# Patient Record
Sex: MALE | Race: WHITE | NOT HISPANIC OR LATINO | Employment: UNEMPLOYED | URBAN - METROPOLITAN AREA
[De-identification: names, ages, dates, MRNs, and addresses within clinical notes are randomized per-mention and may not be internally consistent; named-entity substitution may affect disease eponyms.]

---

## 2024-01-18 ENCOUNTER — HOSPITAL ENCOUNTER (OUTPATIENT)
Facility: OTHER | Age: 39
Discharge: HOME | End: 2024-01-18
Attending: EMERGENCY MEDICINE | Admitting: EMERGENCY MEDICINE
Payer: MEDICAID

## 2024-01-18 VITALS
OXYGEN SATURATION: 96 % | RESPIRATION RATE: 18 BRPM | BODY MASS INDEX: 34.46 KG/M2 | DIASTOLIC BLOOD PRESSURE: 74 MMHG | WEIGHT: 260 LBS | TEMPERATURE: 98 F | HEIGHT: 73 IN | HEART RATE: 97 BPM | SYSTOLIC BLOOD PRESSURE: 136 MMHG

## 2024-01-18 DIAGNOSIS — L03.116 CELLULITIS OF LEFT FOOT: Primary | ICD-10-CM

## 2024-01-18 DIAGNOSIS — M79.89 LEG SWELLING: ICD-10-CM

## 2024-01-18 DIAGNOSIS — L03.119 CELLULITIS, LEG: ICD-10-CM

## 2024-01-18 LAB
ANION GAP SERPL CALC-SCNC: 11 MMOL/L (ref 8–16)
BASOPHILS # BLD AUTO: 0.06 K/UL (ref 0–0.2)
BASOPHILS NFR BLD: 0.6 % (ref 0–1.9)
BUN SERPL-MCNC: 22 MG/DL (ref 6–20)
CALCIUM SERPL-MCNC: 9.3 MG/DL (ref 8.7–10.5)
CHLORIDE SERPL-SCNC: 104 MMOL/L (ref 95–110)
CO2 SERPL-SCNC: 24 MMOL/L (ref 23–29)
CREAT SERPL-MCNC: 1 MG/DL (ref 0.5–1.4)
DIFFERENTIAL METHOD BLD: ABNORMAL
EOSINOPHIL # BLD AUTO: 0.2 K/UL (ref 0–0.5)
EOSINOPHIL NFR BLD: 2.4 % (ref 0–8)
ERYTHROCYTE [DISTWIDTH] IN BLOOD BY AUTOMATED COUNT: 12.8 % (ref 11.5–14.5)
EST. GFR  (NO RACE VARIABLE): >60 ML/MIN/1.73 M^2
GLUCOSE SERPL-MCNC: 98 MG/DL (ref 70–110)
HCT VFR BLD AUTO: 42.6 % (ref 40–54)
HCV AB SERPL QL IA: NEGATIVE
HGB BLD-MCNC: 14 G/DL (ref 14–18)
HIV 1+2 AB+HIV1 P24 AG SERPL QL IA: NEGATIVE
IMM GRANULOCYTES # BLD AUTO: 0.04 K/UL (ref 0–0.04)
IMM GRANULOCYTES NFR BLD AUTO: 0.4 % (ref 0–0.5)
LACTATE SERPL-SCNC: 0.8 MMOL/L (ref 0.5–2.2)
LYMPHOCYTES # BLD AUTO: 2.3 K/UL (ref 1–4.8)
LYMPHOCYTES NFR BLD: 24.3 % (ref 18–48)
MCH RBC QN AUTO: 30.5 PG (ref 27–31)
MCHC RBC AUTO-ENTMCNC: 32.9 G/DL (ref 32–36)
MCV RBC AUTO: 93 FL (ref 82–98)
MONOCYTES # BLD AUTO: 0.7 K/UL (ref 0.3–1)
MONOCYTES NFR BLD: 7.8 % (ref 4–15)
NEUTROPHILS # BLD AUTO: 6.2 K/UL (ref 1.8–7.7)
NEUTROPHILS NFR BLD: 64.5 % (ref 38–73)
NRBC BLD-RTO: 0 /100 WBC
PLATELET # BLD AUTO: 352 K/UL (ref 150–450)
PMV BLD AUTO: 9 FL (ref 9.2–12.9)
POTASSIUM SERPL-SCNC: 4.3 MMOL/L (ref 3.5–5.1)
RBC # BLD AUTO: 4.59 M/UL (ref 4.6–6.2)
SODIUM SERPL-SCNC: 139 MMOL/L (ref 136–145)
WBC # BLD AUTO: 9.54 K/UL (ref 3.9–12.7)

## 2024-01-18 PROCEDURE — 86803 HEPATITIS C AB TEST: CPT | Performed by: EMERGENCY MEDICINE

## 2024-01-18 PROCEDURE — 80048 BASIC METABOLIC PNL TOTAL CA: CPT | Performed by: EMERGENCY MEDICINE

## 2024-01-18 PROCEDURE — 87389 HIV-1 AG W/HIV-1&-2 AB AG IA: CPT | Performed by: EMERGENCY MEDICINE

## 2024-01-18 PROCEDURE — 85025 COMPLETE CBC W/AUTO DIFF WBC: CPT | Performed by: EMERGENCY MEDICINE

## 2024-01-18 PROCEDURE — G0378 HOSPITAL OBSERVATION PER HR: HCPCS

## 2024-01-18 PROCEDURE — 25000003 PHARM REV CODE 250: Performed by: EMERGENCY MEDICINE

## 2024-01-18 PROCEDURE — 83605 ASSAY OF LACTIC ACID: CPT | Performed by: EMERGENCY MEDICINE

## 2024-01-18 PROCEDURE — 63600175 PHARM REV CODE 636 W HCPCS: Performed by: EMERGENCY MEDICINE

## 2024-01-18 PROCEDURE — 63600175 PHARM REV CODE 636 W HCPCS: Performed by: PHYSICIAN ASSISTANT

## 2024-01-18 PROCEDURE — 96366 THER/PROPH/DIAG IV INF ADDON: CPT

## 2024-01-18 PROCEDURE — 96365 THER/PROPH/DIAG IV INF INIT: CPT | Mod: 59

## 2024-01-18 PROCEDURE — 25000003 PHARM REV CODE 250: Performed by: PHYSICIAN ASSISTANT

## 2024-01-18 PROCEDURE — 96375 TX/PRO/DX INJ NEW DRUG ADDON: CPT

## 2024-01-18 PROCEDURE — 99285 EMERGENCY DEPT VISIT HI MDM: CPT | Mod: 25

## 2024-01-18 PROCEDURE — A4216 STERILE WATER/SALINE, 10 ML: HCPCS | Performed by: PHYSICIAN ASSISTANT

## 2024-01-18 PROCEDURE — 25500020 PHARM REV CODE 255: Performed by: STUDENT IN AN ORGANIZED HEALTH CARE EDUCATION/TRAINING PROGRAM

## 2024-01-18 PROCEDURE — 87040 BLOOD CULTURE FOR BACTERIA: CPT | Mod: 59 | Performed by: EMERGENCY MEDICINE

## 2024-01-18 RX ORDER — KETOROLAC TROMETHAMINE 30 MG/ML
15 INJECTION, SOLUTION INTRAMUSCULAR; INTRAVENOUS EVERY 6 HOURS PRN
Status: DISCONTINUED | OUTPATIENT
Start: 2024-01-18 | End: 2024-01-18 | Stop reason: HOSPADM

## 2024-01-18 RX ORDER — NALOXONE HCL 0.4 MG/ML
0.02 VIAL (ML) INJECTION
Status: DISCONTINUED | OUTPATIENT
Start: 2024-01-18 | End: 2024-01-18 | Stop reason: HOSPADM

## 2024-01-18 RX ORDER — AMOXICILLIN 250 MG
1 CAPSULE ORAL 2 TIMES DAILY PRN
Status: DISCONTINUED | OUTPATIENT
Start: 2024-01-18 | End: 2024-01-18 | Stop reason: HOSPADM

## 2024-01-18 RX ORDER — OXYCODONE AND ACETAMINOPHEN 10; 325 MG/1; MG/1
1 TABLET ORAL EVERY 6 HOURS PRN
Status: DISCONTINUED | OUTPATIENT
Start: 2024-01-18 | End: 2024-01-18 | Stop reason: HOSPADM

## 2024-01-18 RX ORDER — KETOROLAC TROMETHAMINE 30 MG/ML
10 INJECTION, SOLUTION INTRAMUSCULAR; INTRAVENOUS
Status: COMPLETED | OUTPATIENT
Start: 2024-01-18 | End: 2024-01-18

## 2024-01-18 RX ORDER — TALC
6 POWDER (GRAM) TOPICAL NIGHTLY PRN
Status: DISCONTINUED | OUTPATIENT
Start: 2024-01-18 | End: 2024-01-18 | Stop reason: HOSPADM

## 2024-01-18 RX ORDER — SODIUM CHLORIDE 9 MG/ML
1000 INJECTION, SOLUTION INTRAVENOUS
Status: COMPLETED | OUTPATIENT
Start: 2024-01-18 | End: 2024-01-18

## 2024-01-18 RX ORDER — SODIUM CHLORIDE 0.9 % (FLUSH) 0.9 %
10 SYRINGE (ML) INJECTION EVERY 8 HOURS
Status: DISCONTINUED | OUTPATIENT
Start: 2024-01-18 | End: 2024-01-18 | Stop reason: HOSPADM

## 2024-01-18 RX ORDER — ENOXAPARIN SODIUM 100 MG/ML
40 INJECTION SUBCUTANEOUS EVERY 24 HOURS
Status: DISCONTINUED | OUTPATIENT
Start: 2024-01-18 | End: 2024-01-18 | Stop reason: HOSPADM

## 2024-01-18 RX ORDER — ACETAMINOPHEN 325 MG/1
650 TABLET ORAL EVERY 6 HOURS PRN
Status: DISCONTINUED | OUTPATIENT
Start: 2024-01-18 | End: 2024-01-18 | Stop reason: HOSPADM

## 2024-01-18 RX ADMIN — IOHEXOL 100 ML: 350 INJECTION, SOLUTION INTRAVENOUS at 08:01

## 2024-01-18 RX ADMIN — Medication 10 ML: at 07:01

## 2024-01-18 RX ADMIN — CEFEPIME 2 G: 2 INJECTION, POWDER, FOR SOLUTION INTRAVENOUS at 06:01

## 2024-01-18 RX ADMIN — KETOROLAC TROMETHAMINE 10 MG: 30 INJECTION, SOLUTION INTRAMUSCULAR; INTRAVENOUS at 03:01

## 2024-01-18 RX ADMIN — SODIUM CHLORIDE 1000 ML: 9 INJECTION, SOLUTION INTRAVENOUS at 03:01

## 2024-01-18 RX ADMIN — VANCOMYCIN HYDROCHLORIDE 2000 MG: 500 INJECTION, POWDER, LYOPHILIZED, FOR SOLUTION INTRAVENOUS at 03:01

## 2024-01-18 NOTE — PROGRESS NOTES
"Pharmacokinetic Initial Assessment: IV Vancomycin    Assessment/Plan:    Initiate intravenous vancomycin with loading dose of 2000 mg once followed by a maintenance dose of vancomycin 2000mg IV every 12 hours  Desired empiric serum trough concentration is 15 to 20 mcg/mL  Draw vancomycin trough level 30 min prior to fourth dose on 1/19/24 at approximately 1500  Pharmacy will continue to follow and monitor vancomycin.      Please contact pharmacy at extension [phone] with any questions regarding this assessment.     Thank you for the consult,   Itz Mattson       Patient brief summary:  Jamal Massey is a 38 y.o. male initiated on antimicrobial therapy with IV Vancomycin for treatment of suspected skin & soft tissue infection    Drug Allergies:   Review of patient's allergies indicates:  No Known Allergies    Actual Body Weight:   117.9 kg    Renal Function:   Estimated Creatinine Clearance: 133.4 mL/min (based on SCr of 1 mg/dL).,     Dialysis Method (if applicable):  N/A    CBC (last 72 hours):  Recent Labs   Lab Result Units 01/18/24  0332   WBC K/uL 9.54   Hemoglobin g/dL 14.0   Hematocrit % 42.6   Platelets K/uL 352   Gran % % 64.5   Lymph % % 24.3   Mono % % 7.8   Eosinophil % % 2.4   Basophil % % 0.6   Differential Method  Automated       Metabolic Panel (last 72 hours):  Recent Labs   Lab Result Units 01/18/24  0332   Sodium mmol/L 139   Potassium mmol/L 4.3   Chloride mmol/L 104   CO2 mmol/L 24   Glucose mg/dL 98   BUN mg/dL 22*   Creatinine mg/dL 1.0       Drug levels (last 3 results):  No results for input(s): "VANCOMYCINRA", "VANCORANDOM", "VANCOMYCINPE", "VANCOPEAK", "VANCOMYCINTR", "VANCOTROUGH" in the last 72 hours.    Microbiologic Results:  Microbiology Results (last 7 days)       Procedure Component Value Units Date/Time    Blood Culture #2 **CANNOT BE ORDERED STAT** [[id number]] Collected: 01/18/24 0331    Order Status: Sent Specimen: Blood from Peripheral, Upper Arm, Right Updated: 01/18/24 0332 "    Blood Culture #1 **CANNOT BE ORDERED STAT** [[id number]] Collected: 01/18/24 0331    Order Status: Sent Specimen: Blood from Peripheral, Upper Arm, Left Updated: 01/18/24 0331

## 2024-01-18 NOTE — ED PROVIDER NOTES
Encounter Date: 1/18/2024    SCRIBE #1 NOTE: I, Rhona Andres, am scribing for, and in the presence of,  Denia Toth MD. I have scribed the following portions of the note - Other sections scribed: HPI, ROS, and physical exam.       History     Chief Complaint   Patient presents with    Wound Check     C/o wound to left ankle s/t LAC on 01/02. Swelling, redness noted to LLE. Gauze noted in place on left ankle. Per pt stated sutures still in palace with green drainage around wound. VSS        This is a 38 y.o. male, with no significant past medical history who presents with complaint of left foot pain with onset 2 weeks ago. He reports that the pain was slowly getting better, but worsened yesterday morning. He notes that he has been doing more standing and walking than he is supposed to. He rates the pain a 10/10. He also reports that the top of his left foot feels numb, which he notes began yesterday morning. He states that the left foot swelling resolved about 1 week ago, but came back 2 days ago. He states that he was taking Tramadol from a previous visit for pain relief. He adds that he finished his 10 day course of Bactrim 1 day ago. He denies any fever or chills.    Per chart review, he presented on 1/2 to  [facility] with a large deep laceration to the ankle and was transferred to [facility] for Ortho eval 2/2 concern for tendon involvement.  He left AMA from [facility] without evaluation and return on 1/3.  He was evaluated bu Ortho and found to not have foot drop and had delayed closure (>24 hours) by ED provider.  He was d/c'ed with Rx for 10 day course of bactrim and a walking boot with Ortho f/u on 1/23.    This is the extent of the patient's complaints at this time.        The history is provided by the patient and medical records.     Review of patient's allergies indicates:  No Known Allergies  No past medical history on file.  Past Surgical History:   Procedure Laterality Date    APPENDECTOMY       No  family history on file.  Social History     Tobacco Use    Smoking status: Unknown     Review of Systems   Constitutional:  Negative for chills, fever and unexpected weight change.   HENT:  Negative for nosebleeds.    Eyes:  Negative for pain.   Respiratory:  Negative for apnea.    Cardiovascular:  Negative for palpitations.   Gastrointestinal:  Negative for constipation.   Genitourinary:  Negative for enuresis.   Musculoskeletal:         + Left foot pain and swelling     Skin:  Positive for wound. Negative for pallor.   Neurological:  Positive for numbness (to left foot).   Hematological:  Does not bruise/bleed easily.   Psychiatric/Behavioral:  Negative for sleep disturbance.        Physical Exam     Initial Vitals [01/18/24 0254]   BP Pulse Resp Temp SpO2   131/81 102 18 98 °F (36.7 °C) 99 %      MAP       --         Physical Exam    Nursing note and vitals reviewed.  Constitutional: He appears well-developed and well-nourished. He does not have a sickly appearance. No distress.   HENT:   Head: Normocephalic and atraumatic.   Right Ear: External ear normal.   Left Ear: External ear normal.   Eyes: Conjunctivae, EOM and lids are normal. Right eye exhibits no discharge. Left eye exhibits no discharge. Right conjunctiva is not injected. Right conjunctiva has no hemorrhage. Left conjunctiva is not injected. Left conjunctiva has no hemorrhage. No scleral icterus.   Neck: Phonation normal. No stridor present. No tracheal deviation present.   Normal range of motion.  Cardiovascular:  Normal rate, regular rhythm and normal heart sounds.     Exam reveals no friction rub.       No murmur heard.  Musculoskeletal:      Cervical back: Normal range of motion.      Comments: 3+ Pitting edema to the dorsum of the left foot with overlying erythema and warmth.  1+ pitting edema to the distal lower leg with erythema and warmth to the anterior aspect of the lower leg.  Compartments of the foot and lower leg are soft.  No calf  tenderness.  Dopplerable PT and DP.  Sensation to light touch along the dorsum of the foot decreased.  Sensation to the plantar surface and lower leg intact.  Unable to dorsiflex or plantarflex at the ankle but is able to dorsiflex toes.  Sutures in place to healing laceration of the anterior aspect of the ankle with no purulent drainage.  anterior aspect of left ankle, and lateral aspect of left lower extremity. Sutures in place.      Neurological: He is alert and oriented to person, place, and time. He has normal strength. GCS eye subscore is 4. GCS verbal subscore is 5. GCS motor subscore is 6.   Skin: Skin is warm.   Psychiatric: He has a normal mood and affect. His speech is normal and behavior is normal. Judgment and thought content normal. Cognition and memory are normal.         ED Course   Procedures  Labs Reviewed   CBC W/ AUTO DIFFERENTIAL - Abnormal; Notable for the following components:       Result Value    RBC 4.59 (*)     MPV 9.0 (*)     All other components within normal limits   BASIC METABOLIC PANEL - Abnormal; Notable for the following components:    BUN 22 (*)     All other components within normal limits   CULTURE, BLOOD   CULTURE, BLOOD   LACTIC ACID, PLASMA   HIV 1 / 2 ANTIBODY    Narrative:     Release to patient->Immediate   HEPATITIS C ANTIBODY    Narrative:     Release to patient->Immediate          Imaging Results              US Lower Extremity Veins Left (Final result)  Result time 01/18/24 04:45:03      Final result by Rudi Jarrell MD (01/18/24 04:45:03)                   Impression:      No evidence of deep venous thrombosis in the left lower extremity.      Electronically signed by: Rudi Jarrell  Date:    01/18/2024  Time:    04:45               Narrative:    EXAMINATION:  US LOWER EXTREMITY VEINS LEFT    CLINICAL HISTORY:  Other specified soft tissue disorders    TECHNIQUE:  Duplex and color flow Doppler evaluation and graded compression of the left lower extremity veins  was performed.    COMPARISON:  None    FINDINGS:  Left thigh veins: The common femoral, femoral, popliteal, upper greater saphenous, and deep femoral veins are patent and free of thrombus. The veins are normally compressible and have normal phasic flow and augmentation response.    Left calf veins: The visualized calf veins are patent.    Contralateral CFV: The contralateral (right) common femoral vein is patent and free of thrombus.    Miscellaneous: None                                       Medications   vancomycin 2 g in dextrose 5 % 500 mL IVPB (2,000 mg Intravenous New Bag 1/18/24 0335)   0.9%  NaCl infusion (1,000 mLs Intravenous New Bag 1/18/24 0335)   ketorolac injection 9.999 mg (9.999 mg Intravenous Given 1/18/24 0334)     Medical Decision Making  38-year-old male presents with concern for worsening left lower leg swelling over the past day.  He had delayed closure of a large deep laceration to the anterior aspect of his left ankle on 1/3.  He denies any systemic sx and reports completing a 10 day course of Bactrim 1 day ago.      Exam today is significant for minimal range of motion at the ankle, marked pitting edema of the dorsum of the foot with decreased sensation of the dorsum of the foot.  History provided by patient conflicts with ortho no from ED visit on 01/03.  According to their notes, the patient was able to dorsiflex and plantar flex and only had decreased sensation between the 1st and 2nd toes.  His exam is consistent concerning for cellulitis of the foot and lower leg.  I do not suspect compartment syndrome given his exam.  I also have low suspicion for DVT as I feel that the swelling is likely dependent edema from prolonged standing recently.  However, will obtain ultrasound to rule out DVT in addition to lab work.  Will plan to give vancomycin and admit to hospital medicine given failure of outpatient antibiotics.    4:56 AM  No DVT on ultrasound.  Labs unremarkable.    Amount and/or  Complexity of Data Reviewed  Labs: ordered.    Risk  Prescription drug management.            Scribe Attestation:   Scribe #1: I performed the above scribed service and the documentation accurately describes the services I performed. I attest to the accuracy of the note.                           Physician Attestation for Scribe: I, Denia Toth  , reviewed documentation as scribed in my presence, which is both accurate and complete.      Clinical Impression:  Final diagnoses:  [M79.89] Leg swelling  [L03.119] Cellulitis, leg  [L03.116] Cellulitis of left foot (Primary)          ED Disposition Condition    Observation Stable                Denia Toth MD  01/18/24 8751

## 2024-01-18 NOTE — DISCHARGE SUMMARY
[facility] Medicine  Discharge Summary      Patient Name: Jamal Massey  MRN: [id number]  TO: [id number]  Patient Class: OP- Observation  Admission Date: 1/18/2024  Hospital Length of Stay: 0 days  Discharge Date and Time:  01/18/2024 2:23 PM  Attending Physician: Susan att. providers found   Discharging Provider: Patricia Willams MD  Primary Care Provider: Susan, Primary Doctor    Primary Care Team: Networked reference to record PCT     HPI:   Mr. Jamal Massey is a 38 y.o. male, with no significant PMH, who presented to [facility] ED on 1/18/24 for a wound check of his left ankle laceration that he obtained on 1/2/24. He states that he had attached a saw blade to a weed eater, and it kicked back and hit him in the left leg. He presented to [facility] ED. At that time there was concern for avulsion fracture on x-ray. He was treated with ancef and gentamicin. He was transferred to [facility] for orthopedic surgery evaluation. He left AMA from [facility], and returned to there on 1/3/24, at which time he was found to have a foot drop. Sutures were placed, and he was discharged with a course of bactrim and placed in a walking boot. He is to follow up with Ortho on 1/23/24. Upon presentation to the ED today, he noted some green drainage from the suture site. He state that yesterday morning the pain worsened, and he noted some swelling for the past two days. He endorses doing more standing and walking than he is supposed to do. As of yesterday morning he noted some numbness of the top of the left foot. He reports he completed his 10 day course of bactrim on e day PTA. He was evaluated in the ED with labs showing no leukocytosis or left shift on CBC, and no significant abnormalities on metabolic panel. An US of the LLE showed not DVT. He was treated in the ED with vancomycin. He was placed on observation.         Hospital Course:   Pt seen today - complaining of increased pain and swelling after finishing course of oral bactrim.  Also complaining of dorsal numbness and inability to move ankle joint. Able to move toes.     CT left ankle completed -  'Cortical disruptions involving the distal fibula and tibia at the level of the patient's laceration.  These may be related to the initial injury/laceration, however underlying osteomyelitis should be strongly considered.  A thin linear tract extends from the cortical disruptions to the skin surface which may represent a thin abscess tract in this patient with history of wound drainage. Diffuse subcutaneous soft tissue edema.'    Seen by ortho and discussed case and CT findings. Recommending 48 hours of IV abx and if improvement, rec 10 days of Cipro 750mg  BID on discharge. Currently on vanc and cefepime.      1pm - Informed by nurse that pt left AMA.          Goals of Care Treatment Preferences:  Code Status: Full Code      Consults:   Consults (From admission, onward)          Status Ordering Provider     Inpatient consult to Orthopedic Surgery  Once        Provider:  (Not yet assigned)    Acknowledged PATRICIA ARAIZA     Pharmacy to dose Vancomycin consult  Once        Provider:  (Not yet assigned)   See [facility] for full Linked Orders Report.    Acknowledged GERMÁN SANCHEZ                Final Active Diagnoses:    Diagnosis Date Noted POA    PRINCIPAL PROBLEM:  Cellulitis of foot, left [L03.119] 01/18/2024 Yes      Problems Resolved During this Admission:       Discharged Condition: against medical advice          Time spent on the discharge of patient: 10 minutes         Patricia Willams MD  Department of Hospital Medicine  [facility] Emergency Dept

## 2024-01-18 NOTE — ED NOTES
Pt insisted on leaving unit to smoke cigarettes. Nicotine patch offered; pt declined. PIV removed. AAOx4. Independently ambulatory with even, steady gait. MELLO. MD Demetrio, and House Supervisor aware.

## 2024-01-18 NOTE — ED TRIAGE NOTES
Jamal Massey, a 38 y.o. male presents to the ED with c/o swelling, redness, and drainage noted to repaired LAC to LLE from 01/02. Sutures intact. Purulent drainage noted to laceration. Left foot warm, red, swollen. Pt appears in NAD. VSS. Pt notes that he picked up and completed ordered oral ABX.      Chief Complaint   Patient presents with    Wound Check     C/o wound to left ankle s/t LAC on 01/02. Swelling, redness noted to LLE. Gauze noted in place on left ankle. Per pt stated sutures still in palace with green drainage around wound. VSS      Review of patient's allergies indicates:  No Known Allergies  No past medical history on file.

## 2024-01-18 NOTE — CONSULTS
Orthopedic consult    Chief complaint:  Laceration to the left ankle    History of present illness:  Patient is a 37-year-old male status post skill saw laceration to left ankle 2 weeks ago.  Patient apparently sustained the injury and was seen at [facility] Emergency room and had local wound I and D along with primary closure.  The patient clinically had some tendon involvement and had weakness to ankle dorsiflexion.  Patient was sent home on oral Bactrim but reported a flare-up of pain swelling in the left ankle.  He presented to the ED at [facility] complaining of pain and increased swelling.  Patient had been scheduled for follow-up orthopedic evaluation at [facility].    Past medical history see chart.      Physical exam:  Patient has a 5 cm laceration over the anterolateral aspect of the ankle just above the ankle mortise.  He does have some swelling over the dorsal aspect of the foot but compartments are soft.  He has extension of the lesser toes but has weakness to ankle dorsiflexion.  Dorsalis pedis pulse intact along with posterior tibial.    Radiographs:  Patient does have some cortical involvement at the distal tibia and CT scan shows involvement of the extensor tendon to the foot along with soft tissue swelling.    Impression: Status post saw injury/laceration to anterior aspect of distal tibia near ankle.  Patient with soft tissue cellulitis along with involvement of tibialis anterior tendon.    Plan:  I did discuss this case with my partner Dr. Bass and we both agree that the patient needs to be treated for the soft tissue cellulitis and then follow up with [facility] for evaluation and treatment of the tendon involvement.  The soft tissue infection needs to be treated before any further tendon surgery.  Continue with walking boot in the interim.  I did discuss this  with the hospitalist and the patient can be discharged home after  IV antibiotics for 24-48 hours and then oral Cipro 750 mg q.12 hours.

## 2024-01-18 NOTE — HOSPITAL COURSE
Pt seen today - complaining of increased pain and swelling after finishing course of oral bactrim. Also complaining of dorsal numbness and inability to move ankle joint. Able to move toes.     CT left ankle completed -  'Cortical disruptions involving the distal fibula and tibia at the level of the patient's laceration.  These may be related to the initial injury/laceration, however underlying osteomyelitis should be strongly considered.  A thin linear tract extends from the cortical disruptions to the skin surface which may represent a thin abscess tract in this patient with history of wound drainage. Diffuse subcutaneous soft tissue edema.'    Seen by ortho and discussed case and CT findings. Recommending 48 hours of IV abx and if improvement, rec 10 days of Cipro 750mg  BID on discharge. Currently on vanc and cefepime.      1pm - Informed by nurse that pt left AMA.

## 2024-01-18 NOTE — HPI
Mr. Jamal Massey is a 38 y.o. male, with no significant PMH, who presented to [facility] ED on 1/18/24 for a wound check of his left ankle laceration that he obtained on 1/2/24. He states that he had attached a saw blade to a weed eater, and it kicked back and hit him in the left leg. He presented to [facility] ED. At that time there was concern for avulsion fracture on x-ray. He was treated with ancef and gentamicin. He was transferred to [facility] for orthopedic surgery evaluation. He left AMA from [facility], and returned to there on 1/3/24, at which time he was found to have a foot drop. Sutures were placed, and he was discharged with a course of bactrim and placed in a walking boot. He is to follow up with Ortho on 1/23/24. Upon presentation to the ED today, he noted some green drainage from the suture site. He state that yesterday morning the pain worsened, and he noted some swelling for the past two days. He endorses doing more standing and walking than he is supposed to do. As of yesterday morning he noted some numbness of the top of the left foot. He reports he completed his 10 day course of bactrim on e day PTA. He was evaluated in the ED with labs showing no leukocytosis or left shift on CBC, and no significant abnormalities on metabolic panel. An US of the LLE showed not DVT. He was treated in the ED with vancomycin. He was placed on observation.

## 2024-01-18 NOTE — SUBJECTIVE & OBJECTIVE
No past medical history on file.    Past Surgical History:   Procedure Laterality Date    APPENDECTOMY         Review of patient's allergies indicates:  No Known Allergies    No current facility-administered medications on file prior to encounter.     Current Outpatient Medications on File Prior to Encounter   Medication Sig    erythromycin (ROMYCIN) ophthalmic ointment Place a 1/2 inch ribbon of ointment into the lower left eyelid TID x 7 days.     Family History    None       Tobacco Use    Smoking status: Unknown    Smokeless tobacco: Not on file   Substance and Sexual Activity    Alcohol use: Not on file    Drug use: Not on file    Sexual activity: Not on file     Review of Systems   Constitutional:  Negative for appetite change, chills, diaphoresis and fever.   Respiratory:  Negative for cough, shortness of breath and wheezing.    Cardiovascular:  Positive for leg swelling. Negative for chest pain and palpitations.   Gastrointestinal:  Negative for abdominal pain, constipation, diarrhea, nausea and vomiting.   Musculoskeletal:  Negative for arthralgias, gait problem, joint swelling and myalgias.   Skin:  Positive for color change and wound. Negative for rash.   Neurological:  Positive for numbness. Negative for dizziness, syncope, weakness, light-headedness and headaches.   Hematological:  Does not bruise/bleed easily.     Objective:     Vital Signs (Most Recent):  Temp: 97.9 °F (36.6 °C) (01/18/24 0502)  Pulse: 78 (01/18/24 0502)  Resp: 18 (01/18/24 0502)  BP: (!) 140/83 (01/18/24 0502)  SpO2: 97 % (01/18/24 0502) Vital Signs (24h Range):  Temp:  [97.9 °F (36.6 °C)-98 °F (36.7 °C)] 97.9 °F (36.6 °C)  Pulse:  [] 78  Resp:  [18] 18  SpO2:  [97 %-99 %] 97 %  BP: (131-140)/(81-83) 140/83     Weight: 117.9 kg (260 lb)  Body mass index is 34.3 kg/m².     Physical Exam  Vitals and nursing note reviewed.   Constitutional:       General: He is not in acute distress.     Appearance: He is well-developed. He is  obese. He is not ill-appearing, toxic-appearing or diaphoretic.      Comments: Unkempt appearance.    HENT:      Head: Normocephalic and atraumatic.      Right Ear: External ear normal.      Left Ear: External ear normal.   Eyes:      General: No scleral icterus.        Right eye: No discharge.         Left eye: No discharge.      Conjunctiva/sclera: Conjunctivae normal.   Neck:      Vascular: No JVD.      Trachea: No tracheal deviation.   Cardiovascular:      Rate and Rhythm: Normal rate and regular rhythm.      Heart sounds: Normal heart sounds. No murmur heard.     No gallop.   Pulmonary:      Effort: Pulmonary effort is normal. No respiratory distress.      Breath sounds: Normal breath sounds. No stridor. No wheezing or rales.   Abdominal:      General: Bowel sounds are normal. There is no distension.      Palpations: Abdomen is soft. There is no mass.      Tenderness: There is no abdominal tenderness. There is no guarding.   Musculoskeletal:         General: No deformity.      Cervical back: Normal range of motion and neck supple.      Comments: Reduced ROM of left ankle in all planes of movement.    Skin:     General: Skin is warm and dry.      Findings: Erythema and lesion present.      Comments: There is a laceration of the anterior aspect of the left ankle. The surrounding skin appears soiled and overall unclean. There is non-pitting edema of the left foot/ankle, and distal LLE.    Neurological:      General: No focal deficit present.      Mental Status: He is alert and oriented to person, place, and time.      Cranial Nerves: No cranial nerve deficit.      Sensory: No sensory deficit (there is decreased sensation of the dorsum of the left foot.).      Motor: No abnormal muscle tone.      Coordination: Coordination normal.   Psychiatric:         Mood and Affect: Mood normal.         Behavior: Behavior normal.         Thought Content: Thought content normal.         Judgment: Judgment normal.               "  Significant Labs: All pertinent labs within the past 24 hours have been reviewed.  BMP:   Recent Labs   Lab 01/18/24  0332   GLU 98      K 4.3      CO2 24   BUN 22*   CREATININE 1.0   CALCIUM 9.3     CBC:   Recent Labs   Lab 01/18/24  0332   WBC 9.54   HGB 14.0   HCT 42.6        CMP:   Recent Labs   Lab 01/18/24  0332      K 4.3      CO2 24   GLU 98   BUN 22*   CREATININE 1.0   CALCIUM 9.3   ANIONGAP 11     Urine Culture: No results for input(s): "LABURIN" in the last 48 hours.  Urine Studies: No results for input(s): "COLORU", "APPEARANCEUA", "PHUR", "SPECGRAV", "PROTEINUA", "GLUCUA", "KETONESU", "BILIRUBINUA", "OCCULTUA", "NITRITE", "UROBILINOGEN", "LEUKOCYTESUR", "RBCUA", "WBCUA", "BACTERIA", "SQUAMEPITHEL", "HYALINECASTS" in the last 48 hours.    Invalid input(s): "WRIGHTSUR"    Significant Imaging: I have reviewed all pertinent imaging results/findings within the past 24 hours.  Imaging Results              US Lower Extremity Veins Left (Final result)  Result time 01/18/24 04:45:03      Final result by Rudi Jarrell MD (01/18/24 04:45:03)                   Impression:      No evidence of deep venous thrombosis in the left lower extremity.      Electronically signed by: Rudi Jarrell  Date:    01/18/2024  Time:    04:45               Narrative:    EXAMINATION:  US LOWER EXTREMITY VEINS LEFT    CLINICAL HISTORY:  Other specified soft tissue disorders    TECHNIQUE:  Duplex and color flow Doppler evaluation and graded compression of the left lower extremity veins was performed.    COMPARISON:  None    FINDINGS:  Left thigh veins: The common femoral, femoral, popliteal, upper greater saphenous, and deep femoral veins are patent and free of thrombus. The veins are normally compressible and have normal phasic flow and augmentation response.    Left calf veins: The visualized calf veins are patent.    Contralateral CFV: The contralateral (right) common femoral vein is patent and " free of thrombus.    Miscellaneous: None

## 2024-01-18 NOTE — ASSESSMENT & PLAN NOTE
- Mr. Jamal Massey presents with green drainage from a laceration of the left ankle   - there is erythema and warmth of the left foot surrounding and inferior to a repaired laceration site   - there is significant lack of attention to hygiene of the affected foot and the patient reports that he usually walks around in sandals   - he has completed a 10 day course of bactrim  - s/p vancomycin in the ED   - continue vanc w/ pharmacy dosing   - adding cefepime   - consider podiatry consultation

## 2024-01-18 NOTE — H&P
[facility]  [facility] & Physical    Patient Name: Jamal Massey  MRN: [id number]  Patient Class: OP- Observation  Admission Date: 1/18/2024  Attending Physician: Patricia Ramsey MD   Primary Care Provider: [facility] Primary [facility]         Patient information was obtained from patient, past medical records, and ER records.     Subjective:     Principal Problem:Cellulitis of foot    Chief Complaint:   Chief Complaint   Patient presents with    Wound Check     C/o wound to left ankle s/t LAC on 01/02. Swelling, redness noted to LLE. Gauze noted in place on left ankle. Per pt stated sutures still in palace with green drainage around wound. VSS         HPI: Mr. Jamal Massey is a 38 y.o. male, with no significant PMH, who presented to [facility] ED on 1/18/24 for a wound check of his left ankle laceration that he obtained on 1/2/24. He states that he had attached a saw blade to a weed eater, and it kicked back and hit him in the left leg. He presented to [facility] ED. At that time there was concern for avulsion fracture on x-ray. He was treated with ancef and gentamicin. He was transferred to [facility] for orthopedic surgery evaluation. He left AMA from [facility], and returned to there on 1/3/24, at which time he was found to have a foot drop. Sutures were placed, and he was discharged with a course of bactrim and placed in a walking boot. He is to follow up with Ortho on 1/23/24. Upon presentation to the ED today, he noted some green drainage from the suture site. He state that yesterday morning the pain worsened, and he noted some swelling for the past two days. He endorses doing more standing and walking than he is supposed to do. As of yesterday morning he noted some numbness of the top of the left foot. He reports he completed his 10 day course of bactrim on e day PTA. He was evaluated in the ED with labs showing no leukocytosis or left shift on CBC, and no significant abnormalities on metabolic panel. An US of  the LLE showed not DVT. He was treated in the ED with vancomycin. He was placed on observation.     No past medical history on file.    Past Surgical History:   Procedure Laterality Date    APPENDECTOMY         Review of patient's allergies indicates:  No Known Allergies    No current facility-administered medications on file prior to encounter.     Current Outpatient Medications on File Prior to Encounter   Medication Sig    erythromycin (ROMYCIN) ophthalmic ointment Place a 1/2 inch ribbon of ointment into the lower left eyelid TID x 7 days.     Family History    None       Tobacco Use    Smoking status: Unknown    Smokeless tobacco: Not on file   Substance and Sexual Activity    Alcohol use: Not on file    Drug use: Not on file    Sexual activity: Not on file     Review of Systems   Constitutional:  Negative for appetite change, chills, diaphoresis and fever.   Respiratory:  Negative for cough, shortness of breath and wheezing.    Cardiovascular:  Positive for leg swelling. Negative for chest pain and palpitations.   Gastrointestinal:  Negative for abdominal pain, constipation, diarrhea, nausea and vomiting.   Musculoskeletal:  Negative for arthralgias, gait problem, joint swelling and myalgias.   Skin:  Positive for color change and wound. Negative for rash.   Neurological:  Positive for numbness. Negative for dizziness, syncope, weakness, light-headedness and headaches.   Hematological:  Does not bruise/bleed easily.     Objective:     Vital Signs (Most Recent):  Temp: 97.9 °F (36.6 °C) (01/18/24 0502)  Pulse: 78 (01/18/24 0502)  Resp: 18 (01/18/24 0502)  BP: (!) 140/83 (01/18/24 0502)  SpO2: 97 % (01/18/24 0502) Vital Signs (24h Range):  Temp:  [97.9 °F (36.6 °C)-98 °F (36.7 °C)] 97.9 °F (36.6 °C)  Pulse:  [] 78  Resp:  [18] 18  SpO2:  [97 %-99 %] 97 %  BP: (131-140)/(81-83) 140/83     Weight: 117.9 kg (260 lb)  Body mass index is 34.3 kg/m².     Physical Exam  Vitals and nursing note reviewed.    Constitutional:       General: He is not in acute distress.     Appearance: He is well-developed. He is obese. He is not ill-appearing, toxic-appearing or diaphoretic.      Comments: Unkempt appearance.    HENT:      Head: Normocephalic and atraumatic.      Right Ear: External ear normal.      Left Ear: External ear normal.   Eyes:      General: No scleral icterus.        Right eye: No discharge.         Left eye: No discharge.      Conjunctiva/sclera: Conjunctivae normal.   Neck:      Vascular: No JVD.      Trachea: No tracheal deviation.   Cardiovascular:      Rate and Rhythm: Normal rate and regular rhythm.      Heart sounds: Normal heart sounds. No murmur heard.     No gallop.   Pulmonary:      Effort: Pulmonary effort is normal. No respiratory distress.      Breath sounds: Normal breath sounds. No stridor. No wheezing or rales.   Abdominal:      General: Bowel sounds are normal. There is no distension.      Palpations: Abdomen is soft. There is no mass.      Tenderness: There is no abdominal tenderness. There is no guarding.   Musculoskeletal:         General: No deformity.      Cervical back: Normal range of motion and neck supple.      Comments: Reduced ROM of left ankle in all planes of movement.    Skin:     General: Skin is warm and dry.      Findings: Erythema and lesion present.      Comments: There is a laceration of the anterior aspect of the left ankle. The surrounding skin appears soiled and overall unclean. There is non-pitting edema of the left foot/ankle, and distal LLE.    Neurological:      General: No focal deficit present.      Mental Status: He is alert and oriented to person, place, and time.      Cranial Nerves: No cranial nerve deficit.      Sensory: No sensory deficit (there is decreased sensation of the dorsum of the left foot.).      Motor: No abnormal muscle tone.      Coordination: Coordination normal.   Psychiatric:         Mood and Affect: Mood normal.         Behavior: Behavior  "normal.         Thought Content: Thought content normal.         Judgment: Judgment normal.                Significant Labs: All pertinent labs within the past 24 hours have been reviewed.  BMP:   Recent Labs   Lab 01/18/24  0332   GLU 98      K 4.3      CO2 24   BUN 22*   CREATININE 1.0   CALCIUM 9.3     CBC:   Recent Labs   Lab 01/18/24  0332   WBC 9.54   HGB 14.0   HCT 42.6        CMP:   Recent Labs   Lab 01/18/24  0332      K 4.3      CO2 24   GLU 98   BUN 22*   CREATININE 1.0   CALCIUM 9.3   ANIONGAP 11     Urine Culture: No results for input(s): "LABURIN" in the last 48 hours.  Urine Studies: No results for input(s): "COLORU", "APPEARANCEUA", "PHUR", "SPECGRAV", "PROTEINUA", "GLUCUA", "KETONESU", "BILIRUBINUA", "OCCULTUA", "NITRITE", "UROBILINOGEN", "LEUKOCYTESUR", "RBCUA", "WBCUA", "BACTERIA", "SQUAMEPITHEL", "HYALINECASTS" in the last 48 hours.    Invalid input(s): "WRIGHTSUR"    Significant Imaging: I have reviewed all pertinent imaging results/findings within the past 24 hours.  Imaging Results              US Lower Extremity Veins Left (Final result)  Result time 01/18/24 04:45:03      Final result by Rudi Jarrell MD (01/18/24 04:45:03)                   Impression:      No evidence of deep venous thrombosis in the left lower extremity.      Electronically signed by: Rudi Jarrell  Date:    01/18/2024  Time:    04:45               Narrative:    EXAMINATION:  US LOWER EXTREMITY VEINS LEFT    CLINICAL HISTORY:  Other specified soft tissue disorders    TECHNIQUE:  Duplex and color flow Doppler evaluation and graded compression of the left lower extremity veins was performed.    COMPARISON:  None    FINDINGS:  Left thigh veins: The common femoral, femoral, popliteal, upper greater saphenous, and deep femoral veins are patent and free of thrombus. The veins are normally compressible and have normal phasic flow and augmentation response.    Left calf veins: The visualized " calf veins are patent.    Contralateral CFV: The contralateral (right) common femoral vein is patent and free of thrombus.    Miscellaneous: None                                       Assessment/Plan:     * Cellulitis of foot, left  - Mr. Jamal Massey presents with green drainage from a laceration of the left ankle   - there is erythema and warmth of the left foot surrounding and inferior to a repaired laceration site   - there is significant lack of attention to hygiene of the affected foot and the patient reports that he usually walks around in sandals   - he has completed a 10 day course of bactrim  - s/p vancomycin in the ED   - continue vanc w/ pharmacy dosing   - adding cefepime   - consider podiatry consultation       VTE Risk Mitigation (From admission, onward)      None                 BRI SonC  Department of Hospital Medicine  [facility] - Emergency Dept

## 2024-01-23 LAB
BACTERIA BLD CULT: NORMAL
BACTERIA BLD CULT: NORMAL